# Patient Record
Sex: MALE | URBAN - METROPOLITAN AREA
[De-identification: names, ages, dates, MRNs, and addresses within clinical notes are randomized per-mention and may not be internally consistent; named-entity substitution may affect disease eponyms.]

---

## 2022-01-17 ENCOUNTER — APPOINTMENT (OUTPATIENT)
Dept: URBAN - METROPOLITAN AREA CLINIC 275 | Age: 67
Setting detail: DERMATOLOGY
End: 2022-01-18

## 2022-01-17 DIAGNOSIS — L57.0 ACTINIC KERATOSIS: ICD-10-CM

## 2022-01-17 DIAGNOSIS — I87.2 VENOUS INSUFFICIENCY (CHRONIC) (PERIPHERAL): ICD-10-CM

## 2022-01-17 PROCEDURE — OTHER PRESCRIPTION: OTHER

## 2022-01-17 PROCEDURE — OTHER PRESCRIPTION MEDICATION MANAGEMENT: OTHER

## 2022-01-17 PROCEDURE — OTHER SEPARATE AND IDENTIFIABLE DOCUMENTATION: OTHER

## 2022-01-17 PROCEDURE — 99203 OFFICE O/P NEW LOW 30 MIN: CPT

## 2022-01-17 PROCEDURE — OTHER COUNSELING: OTHER

## 2022-01-17 PROCEDURE — OTHER REASSURANCE: OTHER

## 2022-01-17 RX ORDER — FLUOROURACIL 2 G/40G
CREAM TOPICAL BID
Qty: 40 | Refills: 3 | Status: ERX | COMMUNITY
Start: 2022-01-17

## 2022-01-17 RX ORDER — TRIAMCINOLONE ACETONIDE 1 MG/G
CREAM TOPICAL BID
Qty: 453.6 | Refills: 3 | Status: ERX | COMMUNITY
Start: 2022-01-17

## 2022-01-17 ASSESSMENT — LOCATION SIMPLE DESCRIPTION DERM
LOCATION SIMPLE: LEFT HAND
LOCATION SIMPLE: LEFT CALF
LOCATION SIMPLE: RIGHT HAND
LOCATION SIMPLE: RIGHT FOREARM
LOCATION SIMPLE: RIGHT CALF
LOCATION SIMPLE: LEFT ELBOW

## 2022-01-17 ASSESSMENT — LOCATION DETAILED DESCRIPTION DERM
LOCATION DETAILED: LEFT ULNAR DORSAL HAND
LOCATION DETAILED: RIGHT PROXIMAL DORSAL FOREARM
LOCATION DETAILED: LEFT ELBOW
LOCATION DETAILED: RIGHT RADIAL DORSAL HAND
LOCATION DETAILED: RIGHT DISTAL CALF
LOCATION DETAILED: LEFT DISTAL CALF

## 2022-01-17 ASSESSMENT — LOCATION ZONE DERM
LOCATION ZONE: ARM
LOCATION ZONE: HAND
LOCATION ZONE: LEG

## 2022-01-17 NOTE — HPI: RASH
What Type Of Note Output Would You Prefer (Optional)?: Standard Output
How Severe Is Your Rash?: moderate
Is This A New Presentation, Or A Follow-Up?: Rash
Additional History: Pt reports swelling around the ankle

## 2022-01-17 NOTE — PROCEDURE: PRESCRIPTION MEDICATION MANAGEMENT
Detail Level: Zone
Render In Strict Bullet Format?: No
Initiate Treatment: Compression stocking  at least 15-20 mmhg
Initiate Treatment: Efudex 5% cream